# Patient Record
Sex: FEMALE | Race: ASIAN | NOT HISPANIC OR LATINO | URBAN - METROPOLITAN AREA
[De-identification: names, ages, dates, MRNs, and addresses within clinical notes are randomized per-mention and may not be internally consistent; named-entity substitution may affect disease eponyms.]

---

## 2017-05-31 ENCOUNTER — EMERGENCY (EMERGENCY)
Facility: HOSPITAL | Age: 37
LOS: 1 days | Discharge: HOME | End: 2017-05-31
Admitting: EMERGENCY MEDICINE
Payer: COMMERCIAL

## 2017-05-31 VITALS
HEART RATE: 74 BPM | WEIGHT: 134.04 LBS | RESPIRATION RATE: 15 BRPM | SYSTOLIC BLOOD PRESSURE: 114 MMHG | OXYGEN SATURATION: 100 % | TEMPERATURE: 98 F | DIASTOLIC BLOOD PRESSURE: 78 MMHG

## 2017-05-31 LAB
ALBUMIN SERPL ELPH-MCNC: 4.3 G/DL — SIGNIFICANT CHANGE UP (ref 3.3–5)
ALP SERPL-CCNC: 67 U/L — SIGNIFICANT CHANGE UP (ref 40–120)
ALT FLD-CCNC: 17 U/L — SIGNIFICANT CHANGE UP (ref 4–33)
APAP SERPL-MCNC: 16.1 UG/ML — SIGNIFICANT CHANGE UP (ref 15–25)
APPEARANCE UR: CLEAR — SIGNIFICANT CHANGE UP
AST SERPL-CCNC: 18 U/L — SIGNIFICANT CHANGE UP (ref 4–32)
BACTERIA # UR AUTO: SIGNIFICANT CHANGE UP
BASOPHILS # BLD AUTO: 0.02 K/UL — SIGNIFICANT CHANGE UP (ref 0–0.2)
BASOPHILS NFR BLD AUTO: 0.2 % — SIGNIFICANT CHANGE UP (ref 0–2)
BILIRUB SERPL-MCNC: 0.2 MG/DL — SIGNIFICANT CHANGE UP (ref 0.2–1.2)
BILIRUB UR-MCNC: NEGATIVE — SIGNIFICANT CHANGE UP
BLOOD UR QL VISUAL: HIGH
BUN SERPL-MCNC: 9 MG/DL — SIGNIFICANT CHANGE UP (ref 7–23)
CALCIUM SERPL-MCNC: 9.4 MG/DL — SIGNIFICANT CHANGE UP (ref 8.4–10.5)
CHLORIDE SERPL-SCNC: 103 MMOL/L — SIGNIFICANT CHANGE UP (ref 98–107)
CO2 SERPL-SCNC: 23 MMOL/L — SIGNIFICANT CHANGE UP (ref 22–31)
COLOR SPEC: YELLOW — SIGNIFICANT CHANGE UP
CREAT SERPL-MCNC: 0.7 MG/DL — SIGNIFICANT CHANGE UP (ref 0.5–1.3)
EOSINOPHIL # BLD AUTO: 0.02 K/UL — SIGNIFICANT CHANGE UP (ref 0–0.5)
EOSINOPHIL NFR BLD AUTO: 0.2 % — SIGNIFICANT CHANGE UP (ref 0–6)
GLUCOSE SERPL-MCNC: 103 MG/DL — HIGH (ref 70–99)
GLUCOSE UR-MCNC: NEGATIVE — SIGNIFICANT CHANGE UP
HCG SERPL-ACNC: < 5 MIU/ML — SIGNIFICANT CHANGE UP
HCT VFR BLD CALC: 44.9 % — SIGNIFICANT CHANGE UP (ref 34.5–45)
HGB BLD-MCNC: 14.7 G/DL — SIGNIFICANT CHANGE UP (ref 11.5–15.5)
IMM GRANULOCYTES NFR BLD AUTO: 0.3 % — SIGNIFICANT CHANGE UP (ref 0–1.5)
KETONES UR-MCNC: NEGATIVE — SIGNIFICANT CHANGE UP
LEUKOCYTE ESTERASE UR-ACNC: NEGATIVE — SIGNIFICANT CHANGE UP
LIDOCAIN IGE QN: 38.2 U/L — SIGNIFICANT CHANGE UP (ref 7–60)
LYMPHOCYTES # BLD AUTO: 1.87 K/UL — SIGNIFICANT CHANGE UP (ref 1–3.3)
LYMPHOCYTES # BLD AUTO: 14.6 % — SIGNIFICANT CHANGE UP (ref 13–44)
MCHC RBC-ENTMCNC: 28.3 PG — SIGNIFICANT CHANGE UP (ref 27–34)
MCHC RBC-ENTMCNC: 32.7 % — SIGNIFICANT CHANGE UP (ref 32–36)
MCV RBC AUTO: 86.5 FL — SIGNIFICANT CHANGE UP (ref 80–100)
MONOCYTES # BLD AUTO: 0.37 K/UL — SIGNIFICANT CHANGE UP (ref 0–0.9)
MONOCYTES NFR BLD AUTO: 2.9 % — SIGNIFICANT CHANGE UP (ref 2–14)
MUCOUS THREADS # UR AUTO: SIGNIFICANT CHANGE UP
NEUTROPHILS # BLD AUTO: 10.53 K/UL — HIGH (ref 1.8–7.4)
NEUTROPHILS NFR BLD AUTO: 81.8 % — HIGH (ref 43–77)
NITRITE UR-MCNC: NEGATIVE — SIGNIFICANT CHANGE UP
NON-SQ EPI CELLS # UR AUTO: <1 — SIGNIFICANT CHANGE UP
PH UR: 6.5 — SIGNIFICANT CHANGE UP (ref 4.6–8)
PLATELET # BLD AUTO: 345 K/UL — SIGNIFICANT CHANGE UP (ref 150–400)
PMV BLD: 10.5 FL — SIGNIFICANT CHANGE UP (ref 7–13)
POTASSIUM SERPL-MCNC: 4.3 MMOL/L — SIGNIFICANT CHANGE UP (ref 3.5–5.3)
POTASSIUM SERPL-SCNC: 4.3 MMOL/L — SIGNIFICANT CHANGE UP (ref 3.5–5.3)
PROT SERPL-MCNC: 8.3 G/DL — SIGNIFICANT CHANGE UP (ref 6–8.3)
PROT UR-MCNC: 20 — SIGNIFICANT CHANGE UP
RBC # BLD: 5.19 M/UL — SIGNIFICANT CHANGE UP (ref 3.8–5.2)
RBC # FLD: 13.8 % — SIGNIFICANT CHANGE UP (ref 10.3–14.5)
RBC CASTS # UR COMP ASSIST: HIGH (ref 0–?)
SODIUM SERPL-SCNC: 141 MMOL/L — SIGNIFICANT CHANGE UP (ref 135–145)
SP GR SPEC: 1.02 — SIGNIFICANT CHANGE UP (ref 1–1.03)
SQUAMOUS # UR AUTO: SIGNIFICANT CHANGE UP
UROBILINOGEN FLD QL: NORMAL E.U. — SIGNIFICANT CHANGE UP (ref 0.1–0.2)
WBC # BLD: 12.85 K/UL — HIGH (ref 3.8–10.5)
WBC # FLD AUTO: 12.85 K/UL — HIGH (ref 3.8–10.5)
WBC UR QL: SIGNIFICANT CHANGE UP (ref 0–?)

## 2017-05-31 PROCEDURE — 99285 EMERGENCY DEPT VISIT HI MDM: CPT

## 2017-05-31 PROCEDURE — 74176 CT ABD & PELVIS W/O CONTRAST: CPT | Mod: 26

## 2017-05-31 RX ORDER — ONDANSETRON 8 MG/1
4 TABLET, FILM COATED ORAL ONCE
Qty: 0 | Refills: 0 | Status: COMPLETED | OUTPATIENT
Start: 2017-05-31 | End: 2017-05-31

## 2017-05-31 RX ORDER — KETOROLAC TROMETHAMINE 30 MG/ML
15 SYRINGE (ML) INJECTION ONCE
Qty: 0 | Refills: 0 | Status: DISCONTINUED | OUTPATIENT
Start: 2017-05-31 | End: 2017-05-31

## 2017-05-31 RX ORDER — ONDANSETRON 8 MG/1
1 TABLET, FILM COATED ORAL
Qty: 15 | Refills: 0 | OUTPATIENT
Start: 2017-05-31 | End: 2017-06-05

## 2017-05-31 RX ORDER — OXYCODONE HYDROCHLORIDE 5 MG/1
1 TABLET ORAL
Qty: 8 | Refills: 0 | OUTPATIENT
Start: 2017-05-31 | End: 2017-06-02

## 2017-05-31 RX ORDER — IBUPROFEN 200 MG
1 TABLET ORAL
Qty: 15 | Refills: 0 | OUTPATIENT
Start: 2017-05-31 | End: 2017-06-05

## 2017-05-31 RX ADMIN — Medication 15 MILLIGRAM(S): at 13:50

## 2017-05-31 RX ADMIN — ONDANSETRON 4 MILLIGRAM(S): 8 TABLET, FILM COATED ORAL at 11:59

## 2017-05-31 RX ADMIN — Medication 15 MILLIGRAM(S): at 14:18

## 2017-05-31 NOTE — ED ADULT TRIAGE NOTE - CCCP TRG CHIEF CMPLNT
flank pain/(L), started in back 3d ago, traveling to L flank and LLQ. w this am vomiting, and dysuria for past month

## 2017-05-31 NOTE — ED PROVIDER NOTE - CARE PLAN
Principal Discharge DX:	Ureteral stone  Instructions for follow-up, activity and diet:	Rest, drink plenty of fluids.  Advance activity as tolerated.  Continue all previously prescribed medications as directed.  Take Motrin 600 mg (three 200 mg over the counter pills) every 8 hours as needed for moderate pain -- take with food. Take Percocet 325/5 once every 6 hours as needed for severe pain -- causes drowsiness; DO NOT drink alcohol, drive, or operate heavy machinery with this medication. Take Zofran 4 mg ODT every 8 hours as needed for nausea and vomiting. Follow up with your primary care physician (DISCUSS LUNG AND LIVER LESIONS ON CT SCAN) and urology (referral list provided)  in 48-72 hours- bring copies of your results.  Return to the ER for worsening or persistent symptoms, including but not limited to fevers, vomiting, worsening/persistent pain and/or ANY NEW OR CONCERNING SYMPTOMS. If you have issues obtaining follow up, please call: [phone] to obtain a doctor or specialist who takes your insurance in your area. Principal Discharge DX:	Ureteral stone  Instructions for follow-up, activity and diet:	Rest, drink plenty of fluids.  Advance activity as tolerated.  Continue all previously prescribed medications as directed.  Take Motrin 600 mg (three 200 mg over the counter pills) every 8 hours as needed for moderate pain -- take with food. Take Percocet 325/5 once every 6 hours as needed for severe pain -- causes drowsiness; DO NOT drink alcohol, drive, or operate heavy machinery with this medication. Take Zofran 4 mg ODT every 8 hours as needed for nausea and vomiting. Follow up with your primary care physician (DISCUSS LUNG AND LIVER LESIONS ON CT SCAN) and urology (referral list provided)  in 48-72 hours- bring copies of your results.  Return to the ER for worsening or persistent symptoms, including but not limited to fevers, vomiting, worsening/persistent pain and/or ANY NEW OR CONCERNING SYMPTOMS. If you have issues obtaining follow up, please call: [phone] to obtain a doctor or specialist who takes your insurance in your area.

## 2017-05-31 NOTE — ED PROVIDER NOTE - OBJECTIVE STATEMENT
Pt is a 36 y/o F nonsmoker no PMHx p/w left sided flank pain x 3 days.  Pt notes gradual onset, constant left sided flank pain nonpleuritic radiating to LLQ described as dull aching, moderate to severe in intensity associated with nausea.  Pt notes pain worsens, at times, with movement.  Today, pt experienced nausea and NBNB vomiting x 1.  Pt states today pain improving, no 6/10 in intensity.  Pt states she took extra strength tylenol x 3 pills, which pt vomited.  Pt then took another 3 extra strength tylenol pills over four hours.  Pt notes dysuria 1 month ago.  Denies any fevers, chills, chest pain, SOB, ROMERO, diarrhea, constipation, hematuria, cloudy urine, vaginal bleeding, abdominal distension, trauma, calf pain/swelling, h/o dvt/pe in past, any prolonged immobilization.  Last BM today.  Pt passing gas today.  No known h/o kidney stones.

## 2017-05-31 NOTE — ED PROVIDER NOTE - PLAN OF CARE
Rest, drink plenty of fluids.  Advance activity as tolerated.  Continue all previously prescribed medications as directed.  Take Motrin 600 mg (three 200 mg over the counter pills) every 8 hours as needed for moderate pain -- take with food. Take Percocet 325/5 once every 6 hours as needed for severe pain -- causes drowsiness; DO NOT drink alcohol, drive, or operate heavy machinery with this medication. Take Zofran 4 mg ODT every 8 hours as needed for nausea and vomiting. Follow up with your primary care physician (DISCUSS LUNG AND LIVER LESIONS ON CT SCAN) and urology (referral list provided)  in 48-72 hours- bring copies of your results.  Return to the ER for worsening or persistent symptoms, including but not limited to fevers, vomiting, worsening/persistent pain and/or ANY NEW OR CONCERNING SYMPTOMS. If you have issues obtaining follow up, please call: [phone] to obtain a doctor or specialist who takes your insurance in your area.

## 2017-05-31 NOTE — ED PROVIDER NOTE - MEDICAL DECISION MAKING DETAILS
Pt is a 36 y/o F nonsmoker no PMHx p/w left sided flank pain x 3 days -- r/o pyelonephritis, r/o renal stone, possibly musculoskeletal -- labs, ua, ucx, CT abd and pelvis w/o contrast, pain control, antiemetics

## 2017-05-31 NOTE — ED PROVIDER NOTE - NONTENDER LOCATION
left upper quadrant/umbilical/right upper quadrant/right lower quadrant/right costovertebral angle/left lower quadrant/suprapubic/periumbilical/left costovertebral angle

## 2017-05-31 NOTE — ED PROVIDER NOTE - PROGRESS NOTE DETAILS
KATE Victor:  Pt notes improvement in symptoms.  Pt tolerating PO solids and liquids.  CT shows 3 mm stone with minimal hydronephrosis.  CT also notes pulmonary and hepatic lesions, which have increase since last CT (2015).  Discussed results at length with pt and family who agrees to have outpatient work up for lesions.  Pt medically stable for discharge.  Pt to follow up with PMD and urology (referral list provided).  Urine strainer provided. KATE Victor:  Pt notes improvement in symptoms.  Pt tolerating PO solids and liquids.  CT shows 3 mm stone with minimal hydronephrosis.  CT also notes pulmonary and hepatic lesions, which have increase since last CT (2015).  Discussed results at length with pt and family who states lesions already known to patient and has serial CT scans with her PMD every 6 months.  Pt medically stable for discharge.  Pt to follow up with PMD and urology (referral list provided).  Urine strainer provided.

## 2017-06-01 LAB — SPECIMEN SOURCE: SIGNIFICANT CHANGE UP

## 2017-06-02 LAB — BACTERIA UR CULT: SIGNIFICANT CHANGE UP

## 2018-02-16 ENCOUNTER — INPATIENT (INPATIENT)
Facility: HOSPITAL | Age: 38
LOS: 0 days | Discharge: HOME | End: 2018-02-17
Attending: HOSPITALIST | Admitting: HOSPITALIST
Payer: COMMERCIAL

## 2018-02-16 VITALS
OXYGEN SATURATION: 119 % | TEMPERATURE: 98 F | HEART RATE: 118 BPM | DIASTOLIC BLOOD PRESSURE: 97 MMHG | RESPIRATION RATE: 16 BRPM | SYSTOLIC BLOOD PRESSURE: 151 MMHG

## 2018-02-16 DIAGNOSIS — J90 PLEURAL EFFUSION, NOT ELSEWHERE CLASSIFIED: ICD-10-CM

## 2018-02-16 LAB
ALBUMIN SERPL ELPH-MCNC: 4.4 G/DL — SIGNIFICANT CHANGE UP (ref 3.3–5)
ALP SERPL-CCNC: 70 U/L — SIGNIFICANT CHANGE UP (ref 40–120)
ALT FLD-CCNC: 9 U/L — SIGNIFICANT CHANGE UP (ref 4–33)
AST SERPL-CCNC: 16 U/L — SIGNIFICANT CHANGE UP (ref 4–32)
BASOPHILS # BLD AUTO: 0.05 K/UL — SIGNIFICANT CHANGE UP (ref 0–0.2)
BASOPHILS NFR BLD AUTO: 0.4 % — SIGNIFICANT CHANGE UP (ref 0–2)
BILIRUB SERPL-MCNC: 0.2 MG/DL — SIGNIFICANT CHANGE UP (ref 0.2–1.2)
BUN SERPL-MCNC: 11 MG/DL — SIGNIFICANT CHANGE UP (ref 7–23)
CALCIUM SERPL-MCNC: 8.9 MG/DL — SIGNIFICANT CHANGE UP (ref 8.4–10.5)
CHLORIDE SERPL-SCNC: 102 MMOL/L — SIGNIFICANT CHANGE UP (ref 98–107)
CK MB BLD-MCNC: 1 NG/ML — SIGNIFICANT CHANGE UP (ref 1–4.7)
CK MB BLD-MCNC: SIGNIFICANT CHANGE UP (ref 0–2.5)
CK SERPL-CCNC: 61 U/L — SIGNIFICANT CHANGE UP (ref 25–170)
CO2 SERPL-SCNC: 21 MMOL/L — LOW (ref 22–31)
CREAT SERPL-MCNC: 0.66 MG/DL — SIGNIFICANT CHANGE UP (ref 0.5–1.3)
EOSINOPHIL # BLD AUTO: 0.08 K/UL — SIGNIFICANT CHANGE UP (ref 0–0.5)
EOSINOPHIL NFR BLD AUTO: 0.6 % — SIGNIFICANT CHANGE UP (ref 0–6)
GLUCOSE SERPL-MCNC: 110 MG/DL — HIGH (ref 70–99)
HCG SERPL-ACNC: < 5 MIU/ML — SIGNIFICANT CHANGE UP
HCT VFR BLD CALC: 45 % — SIGNIFICANT CHANGE UP (ref 34.5–45)
HGB BLD-MCNC: 15 G/DL — SIGNIFICANT CHANGE UP (ref 11.5–15.5)
IMM GRANULOCYTES # BLD AUTO: 0.06 # — SIGNIFICANT CHANGE UP
IMM GRANULOCYTES NFR BLD AUTO: 0.4 % — SIGNIFICANT CHANGE UP (ref 0–1.5)
INR BLD: 1.11 — SIGNIFICANT CHANGE UP (ref 0.88–1.17)
LYMPHOCYTES # BLD AUTO: 23.4 % — SIGNIFICANT CHANGE UP (ref 13–44)
LYMPHOCYTES # BLD AUTO: 3.18 K/UL — SIGNIFICANT CHANGE UP (ref 1–3.3)
MCHC RBC-ENTMCNC: 28.4 PG — SIGNIFICANT CHANGE UP (ref 27–34)
MCHC RBC-ENTMCNC: 33.3 % — SIGNIFICANT CHANGE UP (ref 32–36)
MCV RBC AUTO: 85.1 FL — SIGNIFICANT CHANGE UP (ref 80–100)
MONOCYTES # BLD AUTO: 0.79 K/UL — SIGNIFICANT CHANGE UP (ref 0–0.9)
MONOCYTES NFR BLD AUTO: 5.8 % — SIGNIFICANT CHANGE UP (ref 2–14)
NEUTROPHILS # BLD AUTO: 9.43 K/UL — HIGH (ref 1.8–7.4)
NEUTROPHILS NFR BLD AUTO: 69.4 % — SIGNIFICANT CHANGE UP (ref 43–77)
NRBC # FLD: 0 — SIGNIFICANT CHANGE UP
NT-PROBNP SERPL-SCNC: 11.19 PG/ML — SIGNIFICANT CHANGE UP
PLATELET # BLD AUTO: 432 K/UL — HIGH (ref 150–400)
PMV BLD: 9.9 FL — SIGNIFICANT CHANGE UP (ref 7–13)
POTASSIUM SERPL-MCNC: 4 MMOL/L — SIGNIFICANT CHANGE UP (ref 3.5–5.3)
POTASSIUM SERPL-SCNC: 4 MMOL/L — SIGNIFICANT CHANGE UP (ref 3.5–5.3)
PROT SERPL-MCNC: 8.6 G/DL — HIGH (ref 6–8.3)
PROTHROM AB SERPL-ACNC: 12.3 SEC — SIGNIFICANT CHANGE UP (ref 9.8–13.1)
RBC # BLD: 5.29 M/UL — HIGH (ref 3.8–5.2)
RBC # FLD: 13.4 % — SIGNIFICANT CHANGE UP (ref 10.3–14.5)
SODIUM SERPL-SCNC: 140 MMOL/L — SIGNIFICANT CHANGE UP (ref 135–145)
TROPONIN T SERPL-MCNC: < 0.06 NG/ML — SIGNIFICANT CHANGE UP (ref 0–0.06)
WBC # BLD: 13.59 K/UL — HIGH (ref 3.8–10.5)
WBC # FLD AUTO: 13.59 K/UL — HIGH (ref 3.8–10.5)

## 2018-02-16 PROCEDURE — 99223 1ST HOSP IP/OBS HIGH 75: CPT

## 2018-02-16 PROCEDURE — 71260 CT THORAX DX C+: CPT | Mod: 26

## 2018-02-16 RX ORDER — CHOLECALCIFEROL (VITAMIN D3) 125 MCG
1000 CAPSULE ORAL DAILY
Qty: 0 | Refills: 0 | Status: DISCONTINUED | OUTPATIENT
Start: 2018-02-16 | End: 2018-02-17

## 2018-02-16 RX ORDER — SODIUM CHLORIDE 9 MG/ML
1000 INJECTION INTRAMUSCULAR; INTRAVENOUS; SUBCUTANEOUS
Qty: 0 | Refills: 0 | Status: DISCONTINUED | OUTPATIENT
Start: 2018-02-16 | End: 2018-02-17

## 2018-02-16 RX ORDER — PREGABALIN 225 MG/1
1000 CAPSULE ORAL DAILY
Qty: 0 | Refills: 0 | Status: DISCONTINUED | OUTPATIENT
Start: 2018-02-16 | End: 2018-02-17

## 2018-02-16 RX ORDER — OMEPRAZOLE 10 MG/1
1 CAPSULE, DELAYED RELEASE ORAL
Qty: 0 | Refills: 0 | COMMUNITY

## 2018-02-16 RX ORDER — CHOLECALCIFEROL (VITAMIN D3) 125 MCG
1 CAPSULE ORAL
Qty: 0 | Refills: 0 | COMMUNITY

## 2018-02-16 RX ORDER — PANTOPRAZOLE SODIUM 20 MG/1
40 TABLET, DELAYED RELEASE ORAL
Qty: 0 | Refills: 0 | Status: DISCONTINUED | OUTPATIENT
Start: 2018-02-16 | End: 2018-02-17

## 2018-02-16 RX ORDER — PREGABALIN 225 MG/1
1 CAPSULE ORAL
Qty: 0 | Refills: 0 | COMMUNITY

## 2018-02-16 NOTE — ED PROVIDER NOTE - OBJECTIVE STATEMENT
36 y/o female no pmh c/o cough and sob x4 days. Pt admits to nonproductive cough and esquivel x4 days. Pt can only walk up 1 flight of stairs before becoming sob. Pt went to PMD x1 day ago and had cxr which revealed left sided opacity and was sent to ER to r/o effusion. Pt denies chest pain, palpitations, diaphoresis, n/v/d, numbness, tingling, weakness, dizziness, syncope, fever or chills. Denies leg swelling or recent travel. Of note pt admits to hx of "liver and lung spots", since 2013 which she gets CT scans for every 6 months and both are decreasing in size.

## 2018-02-16 NOTE — H&P ADULT - FAMILY HISTORY
Father  Still living? Unknown  Family history of heart attack, Age at diagnosis: Age Unknown  Family history of diabetes mellitus, Age at diagnosis: Age Unknown

## 2018-02-16 NOTE — H&P ADULT - MUSCULOSKELETAL
details… detailed exam no calf tenderness/no joint swelling/no joint erythema/ROM intact/no joint warmth

## 2018-02-16 NOTE — H&P ADULT - PROBLEM SELECTOR PLAN 2
Likely malignant c/b ROMERO, compressive atelectasis and heart and mediastinum shift 2/2 mass effect;  -Pulmonary c/s in am for decompressive therapeutic and diagnostic thoracentesis in am

## 2018-02-16 NOTE — ED ADULT NURSE NOTE - OBJECTIVE STATEMENT
Pt received to intake ED room 6 to r/o pleural effusion from PMD. Pt reporting cough and SOB x1 week, CXR from PMD showing opacity. Pt received awake, alert, oriented x4; respirations appearing even, unlabored at this time. Pt denies chest pain. Pt does not endorse N/V/D. Visitor at bedside identified as . Unable to obtain Labs/secure IV access at this time, Dr Arceo made aware. Safety maintained.

## 2018-02-16 NOTE — H&P ADULT - PROBLEM SELECTOR PLAN 1
Due to likely metastatic disease c/b leukocytosis and tachycardia in the setting of large Lt pleural effusion; Low suspicion of infectious process;  -Monitor for fever   -VS q4h  -Hold Abx   -Malignancy workup (below)  -Decompressive thoracentesis in am as below (holding DVP ppx)

## 2018-02-16 NOTE — ED PROVIDER NOTE - MEDICAL DECISION MAKING DETAILS
38 y/o female w/ left sided pleural effusion- labs, CT chest, admit Pavithra att: 36 y/o female p/w cough and sob x 4 days here found to have left sided pleural effusion- labs, CT chest, admit

## 2018-02-16 NOTE — H&P ADULT - PROBLEM SELECTOR PLAN 3
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

## 2018-02-16 NOTE — ED ADULT TRIAGE NOTE - CHIEF COMPLAINT QUOTE
pt amb to triage c/o "tiredness" x 4 days sent from [facility] for as per note "eval of SOB on exertion, chest xray shows L side opacity, r/o effusion" no resp distress noted on presentation, denies CP, fever chills, or flu like symptoms

## 2018-02-16 NOTE — H&P ADULT - HISTORY OF PRESENT ILLNESS
36 y/o female no pmh c/o cough nonproductive cough and ROMERO x4 days. States that can walk up 1 flight of stairs before becoming SOB. Was evaluated by a PMD a day ago and had CXR significant for left sided opacity and was sent to ER to r/o effusion. Pt reports no chest pain, palpitations, diaphoresis, n/v/d, numbness, tingling, weakness, dizziness, syncope, fever or chills. Also reports no legs swelling or recent travel. Of note pt reports hx of "liver and lung spots" since 2013 which are monitored with CT scans for every 6 months. 36 y/o female no pmh c/o cough nonproductive cough and tiredness x4 days. States that can walk up 1 flight of stairs before becoming tired. specifically the pt says that does not experience SOB.  Was evaluated by a PMD a day ago and had CXR significant for left sided opacity and was sent to ER to r/o effusion. Pt reports no chest pain, palpitations, diaphoresis, n/v/d, numbness, tingling, weakness, dizziness, syncope, fever or chills. Also reports no legs swelling or recent travel. Of note pt reports hx of "liver and lung spots" since 2013 which are monitored with CT scans for every 6 months. 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 States was sent for second opinion to [facility] in June 2017 and was told that she was "ok".   On this admission the pt c/o nonproductive cough and tiredness x4 days. States that can walk up 1 flight of stairs before becoming tired. Specifically the pt says that does not experience SOB, CP, fever, palpitations sore throat, runny nose.  Was evaluated by a PMD a day ago - CXR was significant for left sided opacity. She was referred to ED to r/o effusion. Pt reports no legs swelling or recent travel. 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 States was sent for second opinion to [facility] in June 2017 and was told that she was "ok".   On this admission the pt c/o nonproductive cough and tiredness x4 days. States that can walk up 1 flight of stairs before becoming tired. Specifically the pt says that does not experience SOB, CP, fever, palpitations sore throat, runny nose.  Was evaluated by a PMD a day ago - CXR was significant for left sided opacity. She was referred to ED to r/o effusion. Pt reports no legs swelling or recent travel.

## 2018-02-16 NOTE — H&P ADULT - ASSESSMENT
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 36 y/o female, non smoker, on OCP, ambulatory without assistive devices, h/o liver mass - 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

## 2018-02-16 NOTE — H&P ADULT - NSHPLABSRESULTS_GEN_ALL_CORE
EKG, 2/16/2018, Sinus tachycardia, 116bpm, qtc 430, no acute Tw or ST changes - my reading     CT of the Chest  with intravenous contrast.   LUNGS AND LARGE AIRWAYS: Patent central airways. Innumerable scattered   bilateral pulmonary nodules which have significantly progressed from   2/10/2015. Interval consolidative left upper lobe opacity measuring 2.5 x   2.1 cm.  PLEURA: Partially loculated large left pleural effusion extending to the   apex with adjacent atelectasis.  VESSELS: Within normal limits.  HEART: No cardiomegaly or pericardial effusion. Heart and mediastinum   shifted to the right side secondary to mass effect from the left-sided   pleural effusion.  MEDIASTINUM AND SANGITA: No lymphadenopathy.  CHEST WALL AND LOWER NECK: Within normal limits.  UPPER ABDOMEN: Scattered hepatic lesions in the visualized upper abdomen   concerning for metastases.  BONES: Blastic lesion noted within the sternal mandibular junction,   concerning for osseous metastasis.  IMPRESSION:  Innumerable scattered bilateral pulmonary nodules which have   significantly progressed from 2/10/2015, concerning for worsening   malignancy or metastatic disease. Interval consolidative left upper lobe   opacity measuring 2.5 x 2.1 cm.    New partially loculated large left pleural effusion extending to the apex   with adjacent compressive atelectasis. Heart and mediastinum shifted to   the right side secondary to mass effect from large left-sided pleural   effusion, decompression is advised.    Additional findings as above.

## 2018-02-17 ENCOUNTER — TRANSCRIPTION ENCOUNTER (OUTPATIENT)
Age: 38
End: 2018-02-17

## 2018-02-17 VITALS
HEART RATE: 106 BPM | TEMPERATURE: 98 F | OXYGEN SATURATION: 99 % | RESPIRATION RATE: 20 BRPM | SYSTOLIC BLOOD PRESSURE: 150 MMHG | DIASTOLIC BLOOD PRESSURE: 94 MMHG

## 2018-02-17 DIAGNOSIS — Z29.9 ENCOUNTER FOR PROPHYLACTIC MEASURES, UNSPECIFIED: ICD-10-CM

## 2018-02-17 DIAGNOSIS — D48.9 NEOPLASM OF UNCERTAIN BEHAVIOR, UNSPECIFIED: ICD-10-CM

## 2018-02-17 DIAGNOSIS — R65.10 SYSTEMIC INFLAMMATORY RESPONSE SYNDROME (SIRS) OF NON-INFECTIOUS ORIGIN WITHOUT ACUTE ORGAN DYSFUNCTION: ICD-10-CM

## 2018-02-17 DIAGNOSIS — J90 PLEURAL EFFUSION, NOT ELSEWHERE CLASSIFIED: ICD-10-CM

## 2018-02-17 PROCEDURE — 99254 IP/OBS CNSLTJ NEW/EST MOD 60: CPT | Mod: GC

## 2018-02-17 PROCEDURE — 99239 HOSP IP/OBS DSCHRG MGMT >30: CPT

## 2018-02-17 PROCEDURE — 99222 1ST HOSP IP/OBS MODERATE 55: CPT | Mod: GC

## 2018-02-17 RX ORDER — NORETHINDRONE AND ETHINYL ESTRADIOL 0.4-0.035
1 KIT ORAL
Qty: 0 | Refills: 0 | COMMUNITY

## 2018-02-17 RX ADMIN — PANTOPRAZOLE SODIUM 40 MILLIGRAM(S): 20 TABLET, DELAYED RELEASE ORAL at 05:50

## 2018-02-17 RX ADMIN — SODIUM CHLORIDE 125 MILLILITER(S): 9 INJECTION INTRAMUSCULAR; INTRAVENOUS; SUBCUTANEOUS at 00:21

## 2018-02-17 RX ADMIN — SODIUM CHLORIDE 125 MILLILITER(S): 9 INJECTION INTRAMUSCULAR; INTRAVENOUS; SUBCUTANEOUS at 05:50

## 2018-02-17 RX ADMIN — SODIUM CHLORIDE 125 MILLILITER(S): 9 INJECTION INTRAMUSCULAR; INTRAVENOUS; SUBCUTANEOUS at 08:03

## 2018-02-17 RX ADMIN — Medication 1000 UNIT(S): at 12:17

## 2018-02-17 RX ADMIN — PREGABALIN 1000 MICROGRAM(S): 225 CAPSULE ORAL at 15:00

## 2018-02-17 NOTE — PROGRESS NOTE ADULT - PROBLEM SELECTOR PLAN 2
r/o  malignancy,  c/b ROMERO, compressive atelectasis and heart and mediastinum shift 2/2 mass effect;  -Pulmonary c/s for decompressive therapeutic and diagnostic thoracentesis

## 2018-02-17 NOTE — PROGRESS NOTE ADULT - SUBJECTIVE AND OBJECTIVE BOX
Patient is a 37y old  Female who presents with a chief complaint of pleural effusion (17 Feb 2018 05:14)      SUBJECTIVE / OVERNIGHT EVENTS: Feels better. Denies any chest pain or SOB.    MEDICATIONS  (STANDING):  cholecalciferol 1000 Unit(s) Oral daily  cyanocobalamin 1000 MICROGram(s) Oral daily  pantoprazole    Tablet 40 milliGRAM(s) Oral before breakfast    MEDICATIONS  (PRN):      Vital Signs Last 24 Hrs  T(C): 37 (17 Feb 2018 06:50), Max: 37 (16 Feb 2018 22:15)  T(F): 98.6 (17 Feb 2018 06:50), Max: 98.6 (16 Feb 2018 22:15)  HR: 105 (17 Feb 2018 06:50) (100 - 118)  BP: 133/90 (17 Feb 2018 06:50) (124/78 - 151/97)  BP(mean): --  RR: 18 (17 Feb 2018 06:50) (16 - 19)  SpO2: 96% (17 Feb 2018 06:50) (95% - 119%)  CAPILLARY BLOOD GLUCOSE        I&O's Summary      PHYSICAL EXAM:  GENERAL: NAD, well-developed  HEAD:  Atraumatic, Normocephalic  EYES: EOMI, PERRLA, conjunctiva and sclera clear  NECK: Supple, No JVD  CHEST/LUNG: Decreased BS at left base; No wheeze  HEART: Regular rate and rhythm; No murmurs, rubs, or gallops  ABDOMEN: Soft, Nontender, Nondistended; Bowel sounds present  EXTREMITIES:  2+ Peripheral Pulses, No clubbing, cyanosis, or edema  PSYCH: AAOx3  NEUROLOGY: non-focal  SKIN: No rashes or lesions    LABS:                        15.0   13.59 )-----------( 432      ( 16 Feb 2018 20:02 )             45.0     02-16    140  |  102  |  11  ----------------------------<  110<H>  4.0   |  21<L>  |  0.66    Ca    8.9      16 Feb 2018 20:02    TPro  8.6<H>  /  Alb  4.4  /  TBili  0.2  /  DBili  x   /  AST  16  /  ALT  9   /  AlkPhos  70  02-16    PT/INR - ( 16 Feb 2018 20:02 )   PT: 12.3 SEC;   INR: 1.11            CARDIAC MARKERS ( 16 Feb 2018 20:02 )  x     / < 0.06 ng/mL / 61 u/L / 1.00 ng/mL / x              RADIOLOGY & ADDITIONAL TESTS:    Imaging Personally Reviewed: CT of chest  < from: CT Chest w/ IV Cont (02.16.18 @ 20:32) >  IMPRESSION:    Innumerable scattered bilateral pulmonary nodules which have   significantly progressed from 2/10/2015, concerning for worsening   malignancy or metastatic disease.Interval consolidative left upper lobe   opacity measuring 2.5 x 2.1cm.    New partially loculated large left pleural effusion extending to the apex   with adjacent compressive atelectasis.Heart and mediastinum shifted to   the right side secondary to mass effect from large left-sided pleural   effusion, decompression is advised.    Additional findings as above.    < end of copied text >      Consultant(s) Notes Reviewed:      Care Discussed with Consultants/Other Providers:

## 2018-02-17 NOTE — CONSULT NOTE ADULT - SUBJECTIVE AND OBJECTIVE BOX
CHIEF COMPLAINT:    HPI:    PAST MEDICAL & SURGICAL HISTORY:  No pertinent past medical history  No significant past surgical history      FAMILY HISTORY:  Family history of diabetes mellitus (Father)  Family history of heart attack (Father)      SOCIAL HISTORY:  Smoking: __ packs x ___ years  EtOH Use:  Marital Status:  Occupation:  Recent Travel:  Country of Birth:  Advance Directives:    Allergies    No Known Allergies    Intolerances        HOME MEDICATIONS:    REVIEW OF SYSTEMS:  Constitutional:   Eyes:  ENT:  CV:  Resp:  GI:  :  MSK:  Integumentary:  Neurological:  Psychiatric:  Endocrine:  Hematologic/Lymphatic:  Allergic/Immunologic:  [ ] All other systems negative  [ ] Unable to assess ROS because ________    OBJECTIVE:  ICU Vital Signs Last 24 Hrs  T(C): 37 (17 Feb 2018 06:50), Max: 37 (16 Feb 2018 22:15)  T(F): 98.6 (17 Feb 2018 06:50), Max: 98.6 (16 Feb 2018 22:15)  HR: 105 (17 Feb 2018 06:50) (100 - 118)  BP: 133/90 (17 Feb 2018 06:50) (124/78 - 151/97)  BP(mean): --  ABP: --  ABP(mean): --  RR: 18 (17 Feb 2018 06:50) (16 - 19)  SpO2: 96% (17 Feb 2018 06:50) (95% - 119%)        CAPILLARY BLOOD GLUCOSE          PHYSICAL EXAM:  General:  AAOx3  HEENT: EOMI, PERRL  Lymph Nodes: No LAD  Neck: JVP 4-6cm  Respiratory:   Cardiovascular: RRR, no m/r/g  Abdomen: soft, NT/ND, no HSM  Extremities: no c/c/e  Skin: nl. skin turgor  Neurological: no deficitis      HOSPITAL MEDICATIONS:  MEDICATIONS  (STANDING):  cholecalciferol 1000 Unit(s) Oral daily  cyanocobalamin 1000 MICROGram(s) Oral daily  pantoprazole    Tablet 40 milliGRAM(s) Oral before breakfast    MEDICATIONS  (PRN):      LABS:                        15.0   13.59 )-----------( 432      ( 16 Feb 2018 20:02 )             45.0     02-16    140  |  102  |  11  ----------------------------<  110<H>  4.0   |  21<L>  |  0.66    Ca    8.9      16 Feb 2018 20:02    TPro  8.6<H>  /  Alb  4.4  /  TBili  0.2  /  DBili  x   /  AST  16  /  ALT  9   /  AlkPhos  70  02-16    PT/INR - ( 16 Feb 2018 20:02 )   PT: 12.3 SEC;   INR: 1.11                    MICROBIOLOGY:     RADIOLOGY:  [ ] Reviewed and interpreted by me    PULMONARY FUNCTION TESTS:    EKG: CHIEF COMPLAINT:    HPI:    36 y/o female, non smoker, on OCP, ambulatory without assistive devices, the pt has h/o liver mass - per [facility] records 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: MRI Abd w/ con 10/22/2013, CT chest 9/13 - [facility] scanned documents, r/o metastatic disease per report);    PAST MEDICAL & SURGICAL HISTORY:  No pertinent past medical history  No significant past surgical history      FAMILY HISTORY:  Family history of diabetes mellitus (Father)  Family history of heart attack (Father)      SOCIAL HISTORY:  Smoking: __ packs x ___ years  EtOH Use:  Marital Status:  Occupation:  Recent Travel:  Country of Birth:  Advance Directives:    Allergies    No Known Allergies    Intolerances        HOME MEDICATIONS:    REVIEW OF SYSTEMS:  Constitutional:   Eyes:  ENT:  CV:  Resp:  GI:  :  MSK:  Integumentary:  Neurological:  Psychiatric:  Endocrine:  Hematologic/Lymphatic:  Allergic/Immunologic:  [ ] All other systems negative  [ ] Unable to assess ROS because ________    OBJECTIVE:  ICU Vital Signs Last 24 Hrs  T(C): 37 (17 Feb 2018 06:50), Max: 37 (16 Feb 2018 22:15)  T(F): 98.6 (17 Feb 2018 06:50), Max: 98.6 (16 Feb 2018 22:15)  HR: 105 (17 Feb 2018 06:50) (100 - 118)  BP: 133/90 (17 Feb 2018 06:50) (124/78 - 151/97)  BP(mean): --  ABP: --  ABP(mean): --  RR: 18 (17 Feb 2018 06:50) (16 - 19)  SpO2: 96% (17 Feb 2018 06:50) (95% - 119%)        CAPILLARY BLOOD GLUCOSE          PHYSICAL EXAM:  General:  AAOx3  HEENT: EOMI, PERRL  Lymph Nodes: No LAD  Neck: JVP 4-6cm  Respiratory:   Cardiovascular: RRR, no m/r/g  Abdomen: soft, NT/ND, no HSM  Extremities: no c/c/e  Skin: nl. skin turgor  Neurological: no deficitis      HOSPITAL MEDICATIONS:  MEDICATIONS  (STANDING):  cholecalciferol 1000 Unit(s) Oral daily  cyanocobalamin 1000 MICROGram(s) Oral daily  pantoprazole    Tablet 40 milliGRAM(s) Oral before breakfast    MEDICATIONS  (PRN):      LABS:                        15.0   13.59 )-----------( 432      ( 16 Feb 2018 20:02 )             45.0     02-16    140  |  102  |  11  ----------------------------<  110<H>  4.0   |  21<L>  |  0.66    Ca    8.9      16 Feb 2018 20:02    TPro  8.6<H>  /  Alb  4.4  /  TBili  0.2  /  DBili  x   /  AST  16  /  ALT  9   /  AlkPhos  70  02-16    PT/INR - ( 16 Feb 2018 20:02 )   PT: 12.3 SEC;   INR: 1.11                    MICROBIOLOGY:     RADIOLOGY:  [ ] Reviewed and interpreted by me    PULMONARY FUNCTION TESTS:    EKG: CHIEF COMPLAINT:    HPI:    36 y/o female with PMH of a liver lesion- 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  Rare tumor which is similar to a sarcoma.  Never received therapy and was following with serial imaging over the next 4 years.    She presented to the hospital with c/o dyspnea on exertion. Symptoms began on Monday and she went to her PMD who took a CXR, saw an abnormality and told her to come into the hospital.    Denies cough/fevers/chills.    She is a non-smoker, worked as a home .     PAST MEDICAL & SURGICAL HISTORY:  No pertinent past medical history  No significant past surgical history      FAMILY HISTORY:  Family history of diabetes mellitus (Father)  Family history of heart attack (Father)      SOCIAL HISTORY:  Smoking: never  EtOH Use: never    Allergies    No Known Allergies    Intolerances        HOME MEDICATIONS:  · 	Zofran ODT 4 mg oral tablet, disintegratin tab(s) orally every 8 hours, As Needed - for nausea  · 	acetaminophen-oxyCODONE 325 mg-5 mg oral tablet: 1 tab(s) orally every 6 hours, As Needed -for severe pain MDD:4  · 	 mg oral tablet: 1 tab(s) orally every 8 hours, As Needed - for moderate pain  · 	Naprosyn 500 mg oral tablet: 1 tab(s) orally 2 times a day  · 	cyclobenzaprine 10 mg oral tablet: 1 tab(s) orally every 8 hours, As Needed    REVIEW OF SYSTEMS:  Constitutional:   Eyes:  ENT:  CV:  Resp: sob  GI:  :  MSK:  Integumentary:  Neurological:  Psychiatric:  Endocrine:  Hematologic/Lymphatic:  Allergic/Immunologic:  [x ] All other systems negative  [ ] Unable to assess ROS because ________    OBJECTIVE:  ICU Vital Signs Last 24 Hrs  T(C): 37 (2018 06:50), Max: 37 (2018 22:15)  T(F): 98.6 (2018 06:50), Max: 98.6 (2018 22:15)  HR: 105 (2018 06:50) (100 - 118)  BP: 133/90 (2018 06:50) (124/78 - 151/97)  BP(mean): --  ABP: --  ABP(mean): --  RR: 18 (2018 06:50) (16 - 19)  SpO2: 96% (2018 06:50) (95% - 119%)        CAPILLARY BLOOD GLUCOSE          PHYSICAL EXAM:  General:  AAOx3  HEENT: EOMI, PERRL  Lymph Nodes: No LAD  Neck: JVP 4-6cm  Respiratory: decreased bs on left  Cardiovascular: RRR, no m/r/g  Abdomen: soft, NT/ND, no HSM  Extremities: no c/c/e  Skin: nl. skin turgor  Neurological: no deficitis      HOSPITAL MEDICATIONS:  MEDICATIONS  (STANDING):  cholecalciferol 1000 Unit(s) Oral daily  cyanocobalamin 1000 MICROGram(s) Oral daily  pantoprazole    Tablet 40 milliGRAM(s) Oral before breakfast    MEDICATIONS  (PRN):      LABS:                        15.0   13.59 )-----------( 432      ( 2018 20:02 )             45.0     02-16    140  |  102  |  11  ----------------------------<  110<H>  4.0   |  21<L>  |  0.66    Ca    8.9      2018 20:02    TPro  8.6<H>  /  Alb  4.4  /  TBili  0.2  /  DBili  x   /  AST  16  /  ALT  9   /  AlkPhos  70  02-16    PT/INR - ( 2018 20:02 )   PT: 12.3 SEC;   INR: 1.11            RADIOLOGY:  [x] Reviewed and interpreted by me

## 2018-02-17 NOTE — CONSULT NOTE ADULT - ASSESSMENT
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    1. Dyspnea-  -Etiology: Likely 2/2 progression of underlying hemangioendothelioma with metastatic disease to lung  -POCUS: small left pleural effusion, diffuse B-line pattern with irregular appearing pleura  -Clinically stable, on RA  -No role for antibiotics  -Follow up heme/onc at [facility] [place]  -T/c cough suppressant to improve symptoms    Gabe Muhammad DO  Pulmonary and Critical Care Fellow

## 2018-02-17 NOTE — CONSULT NOTE ADULT - SUBJECTIVE AND OBJECTIVE BOX
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 States was sent for second opinion to [facility] [place] in June 2017 and was told that she was "ok".   On this admission the pt c/o nonproductive cough and tiredness x4 days. States that can walk up 1 flight of stairs before becoming tired. Specifically the pt says that does not experience SOB, CP, fever, palpitations sore throat, runny nose.  Was evaluated by a PMD a day ago - CXR was significant for left sided opacity. She was referred to ED to r/o effusion. Pt reports no legs swelling or recent travel. (16 Feb 2018 23:14)      Allergies    No Known Allergies    Intolerances        MEDICATIONS  (STANDING):  cholecalciferol 1000 Unit(s) Oral daily  cyanocobalamin 1000 MICROGram(s) Oral daily  pantoprazole    Tablet 40 milliGRAM(s) Oral before breakfast    MEDICATIONS  (PRN):      PAST MEDICAL & SURGICAL HISTORY:  No pertinent past medical history  No significant past surgical history      FAMILY HISTORY:  Family history of diabetes mellitus (Father)  Family history of heart attack (Father)      SOCIAL HISTORY: No EtOH, no tobacco    REVIEW OF SYSTEMS:    CONSTITUTIONAL: No weakness, fevers or chills  EYES/ENT: No visual changes;  No vertigo or throat pain   NECK: No pain or stiffness  RESPIRATORY: No cough, wheezing, hemoptysis; No shortness of breath  CARDIOVASCULAR: No chest pain or palpitations  GASTROINTESTINAL: No abdominal or epigastric pain. No nausea, vomiting, or hematemesis; No diarrhea or constipation. No melena or hematochezia.  GENITOURINARY: No dysuria, frequency or hematuria  NEUROLOGICAL: No numbness or weakness  SKIN: No itching, burning, rashes, or lesions   All other review of systems is negative unless indicated above.    Height (cm): 165.1 (02-17 @ 02:49)  Weight (kg): 61.2 (02-17 @ 05:00)  BMI (kg/m2): 22.5 (02-17 @ 05:00)  BSA (m2): 1.67 (02-17 @ 05:00)    T(F): 98.2 (02-17-18 @ 15:33), Max: 98.6 (02-16-18 @ 22:15)  HR: 106 (02-17-18 @ 15:33)  BP: 150/94 (02-17-18 @ 15:33)  RR: 20 (02-17-18 @ 15:33)  SpO2: 99% (02-17-18 @ 15:33)  Wt(kg): --    GENERAL: NAD, well-developed  HEAD:  Atraumatic, Normocephalic  EYES: EOMI, PERRLA, conjunctiva and sclera clear  NECK: Supple, No JVD  CHEST/LUNG: Clear to auscultation bilaterally; No wheeze  HEART: Regular rate and rhythm; No murmurs, rubs, or gallops  ABDOMEN: Soft, Nontender, Nondistended; Bowel sounds present  EXTREMITIES:  2+ Peripheral Pulses, No clubbing, cyanosis, or edema  NEUROLOGY: non-focal  SKIN: No rashes or lesions                          15.0   13.59 )-----------( 432      ( 16 Feb 2018 20:02 )             45.0       02-16    140  |  102  |  11  ----------------------------<  110<H>  4.0   |  21<L>  |  0.66    Ca    8.9      16 Feb 2018 20:02    TPro  8.6<H>  /  Alb  4.4  /  TBili  0.2  /  DBili  x   /  AST  16  /  ALT  9   /  AlkPhos  70  02-16    < from: CT Chest w/ IV Cont (02.16.18 @ 20:32) >    IMPRESSION:    Innumerable scattered bilateral pulmonary nodules which have   significantly progressed from 2/10/2015, concerning for worsening   malignancy or metastatic disease.Interval consolidative left upper lobe   opacity measuring 2.5 x 2.1cm.    New partially loculated large left pleural effusion extending to the apex   with adjacent compressive atelectasis.Heart and mediastinum shifted to   the right side secondary to mass effect from large left-sided pleural   effusion, decompression is advised.    Additional findings as above.    < end of copied text >

## 2018-02-17 NOTE — PROGRESS NOTE ADULT - PROBLEM SELECTOR PLAN 3
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

## 2018-02-17 NOTE — DISCHARGE NOTE ADULT - CARE PLAN
Principal Discharge DX:	Pleural effusion on left  Goal:	without difficulty breathing  Assessment and plan of treatment:	Notified your doctor if experiencing shortness of breath or difficulty. Consider calling 911 or going to your nearest emergency room if you are experiencing difficulty with Breathing.  Follow up with your doctor at [facility]  Secondary Diagnosis:	Hemangioendothelioma  Assessment and plan of treatment:	follow up with your Oncologist at [facility]  Secondary Diagnosis:	Cough  Assessment and plan of treatment:	continue Tessalon Perles as needed

## 2018-02-17 NOTE — DISCHARGE NOTE ADULT - MEDICATION SUMMARY - MEDICATIONS TO TAKE
I will START or STAY ON the medications listed below when I get home from the hospital:    benzonatate 100 mg oral capsule  -- 1 cap(s) by mouth every 6 hours, As Needed   -- May cause drowsiness.  Alcohol may intensify this effect.  Use care when operating dangerous machinery.  Swallow whole.  Do not crush.    -- Indication: For cough    omeprazole 40 mg oral delayed release capsule  -- 1 cap(s) by mouth once a day  -- Indication: For GERD    Vitamin B12 1000 mcg oral tablet  -- 1 tab(s) by mouth once a day  -- Indication: For Supplement    Vitamin D3 1000 intl units oral capsule  -- 1 cap(s) by mouth once a day  -- Indication: For Supplement

## 2018-02-17 NOTE — CONSULT NOTE ADULT - ATTENDING COMMENTS
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 Patient has dry cough and mild dyspnea with extreme exertion. O2 sat is 96-98% on RA. I d/w pt and  the risks and benefits of thoracentesis, which would only be needed for therapeutic measure and at this time, she reports minimal respiratory symptoms. Also her extensive tumor burden is more likely contributing to her symptoms than the pleural effusion. No intervention at this time. Pt to f/u with her oncologist at [facility].

## 2018-02-17 NOTE — DISCHARGE NOTE ADULT - PLAN OF CARE
without difficulty breathing Notified your doctor if experiencing shortness of breath or difficulty. Consider calling 911 or going to your nearest emergency room if you are experiencing difficulty with Breathing.  Follow up with your doctor at [facility] follow up with your Oncologist at [facility] continue Tessalon Perles as needed

## 2018-02-17 NOTE — DISCHARGE NOTE ADULT - CARE PROVIDER_API CALL
Joseluis Thompson; MBBS), Internal Medicine  [address] [address] [place] [address]  Phone: [phone]  Fax: [phone]

## 2018-02-17 NOTE — CONSULT NOTE ADULT - PROBLEM SELECTOR RECOMMENDATION 9
patient has not seen Dr. Shaw since 2014, follows at [facility]  last imaging in our system was in 2015, had imaging in June 2017 at [facility]  -pulm consult for thoracentesis with cytology  patient should follow up with outpatient oncologist for next steps

## 2018-02-17 NOTE — DISCHARGE NOTE ADULT - HOSPITAL COURSE
36 y/o female, non smoker, on OCP, ambulatory without assistive devices, the pt has h/o hemangioendothelioma, multiple liver and lung lesion. Pt follows at [facility] was last evaluated in June 2017 and was told that she was "ok". Pt with c/o nonproductive cough and tiredness x4 days. CT chest noted-New partially loculated large left pleural effusion extending to the apex with adjacent compressive atelectasis. Heart and mediastinum shifted to the right side secondary to mass effect from large left-sided pleural effusion. Pulmonary was consulted for possible decompressive thoracentesis. Pt currently with O2 sat is 96-98% on RA. Pulmonary discussed  the risks and benefits of thoracentesis, which would only be needed for therapeutic measure and at this time, she reports minimal respiratory symptoms. Pulmonary impression, Pt extensive tumor burden is more likely contributing to her symptoms than the pleural effusion. No intervention at this time. Pt to f/u with her oncologist at [facility] [place] [facility].  Pt medically stable for discharge.

## 2018-02-17 NOTE — PATIENT PROFILE ADULT. - NS TRANSFER PATIENT BELONGINGS
Clothing earings silver colored/Clothing/Jewelry/Money (specify)/Other belongings/Cell Phone/PDA (specify)

## 2018-02-17 NOTE — PROGRESS NOTE ADULT - PROBLEM SELECTOR PLAN 1
Due to likely metastatic disease c/b leukocytosis and tachycardia in the setting of large Lt pleural effusion; Low suspicion of infectious process;  -Monitor for fever   -No signs of infection at this time  -VS q4h  -Hold Abx   -Malignancy workup (below)  -Decompressive thoracentesis in am as below (holding DVP ppx)

## 2021-10-05 NOTE — PROGRESS NOTE ADULT - ASSESSMENT
36 y/o female, non smoker, on OCP, ambulatory without assistive devices, h/o liver mass - 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 78

## 2022-02-15 NOTE — PATIENT PROFILE ADULT. - HEALTH/HEALTHCARE ANXIETIES, PROFILE
Detail Level: Zone
Continue Regimen: spironolactone 50 mg tablet Qd\\nQuantity: 90.0 Tablet  Days Supply: 90\\nSig: Take one PO qd with food and water. No asa while taking.\\n\\ntretinoin 0.1 % topical cream qhs\\nQuantity: 45.0 g  Days Supply: 60\\nSig: apply thin layer to face qpm  follow with moisturizer
Render In Strict Bullet Format?: No
none

## 2025-02-01 NOTE — H&P ADULT - ENMT
[facility] Consult Note      HISTORY OF PRESENT ILLNESS:    The patient is a pleasant 68-year-old female with significant history of hypertension, diabetes, hypothyroidism, and non-Hodgkin's lymphoma, in remission, who presented to the hospital for management of a MDR Klebsiella UTI.  Patient had developed dysuria, urgency, and frequency starting around January 27.  She went to an urgent care clinic January 29 and had a urine culture sent.  She was given a prescription for 1 dose of fosfomycin which she took that day with some improvement of her symptoms.  Her urine culture went up growing ESBL Klebsiella and she was advised to go to the hospital for IV antibiotics.  She denies any fever, chills, nausea, vomiting, abdominal pain, or diarrhea.  While her urinary symptoms improved after the dose of fosfomycin, the did not fully resolve.  She still has some mild dysuria and frequency.  Infectious disease has been asked to see the patient for advice on therapy.    Patient Past History  Past Medical History:   Diagnosis Date    Abnormal findings on diagnostic imaging of urinary organs 2024    Anemia     Iron deficiency    Atypical squamous cells of undetermined significance on cytologic smear of cervix (ASC-US) 01/31/2013    Bilateral carotid artery stenosis     US Carotid Duplex 5/12/21: moderate stenosis ~50% in the bilateral proximal internal carotid arteries    Essential (primary) hypertension     Fatty liver     Fracture of metacarpal, closed 10/04/2014    History of recurrent UTIs     Hypertriglyceridemia     Hypothyroidism     Multiple thyroid nodules     thyroid ultrasound on 2/28/2018 that revealed a diffusely heterogeneous thyroid gland. Bilateral subcentimeter nodules.    Non-Hodgkin lymphoma (CMD) 1991    s/p chemo and radiation    Nuclear senile cataract of both eyes     Osteopenia 03/11/2007    Overactive bladder     Recurrent UTI     Renal cyst, acquired     CT A/P with contrast  (12/12/19) 2 tiny simple left renal cysts.    Spinal stenosis of lumbar region 12/12/2019    Severe facet arthropathy at the L4-L5 level with minimal spondylolisthesis and moderate central spinal stenosis.    Type 2 diabetes mellitus (CMD)     Ureter, stricture     Vitamin D deficiency        Past Surgical History:   Procedure Laterality Date    Cath place for coronary angiography w md sup - interp graft & rt heart  06/14/2024    She had a coronary angiogram on 6/14/2024 by Dr. Jose Gilliam revealed nonobstructive coronary artery disease and normal cardiac filling pressures.    Colonoscopy  10/28/2016    Dr. Daniel Tarango. Polypectomy x 8. Pathology 5 tubular adenomas and 1 hyperplastic polyp and 2 lymphoid aggregates were removed.    Colonoscopy  11/02/2019    Dr. Daniel Tarango. polypectomy x7. Pathology with 6 tubular adenomas and 1 hyperplastic polyp were removed.    Colonoscopy diagnostic  12/14/2022    Dr. Ian Garrett; tubular adenoma x2 sigmoid colon (removed), tubular adenoma cecum (removed), tubular adenoma appendiceal office (biopsied), tubular adenoma ascending colon (biopsied), hyperplastic polyp descending colon (biopsied), tubular adenoma rectosigmoid (biopsy), hyperplastic polyp rectum (removed)    Colonoscopy diagnostic  05/28/2024    Dr. Ian Garrett; Tubular adenoma x2 cecum (biopsied), tubulovillous adenoma cecum (biopsied), 3 tubular/tubulovillous adenomas ascending colon (biopsied), 3 tubular adenomas transverse colon (biopsied)    Colonoscopy diagnostic  10/16/2024    She had a colonoscopy with numerous polyps removed on 10/16/2024 by Dr. Ian Garrett.  4 tubular adenomas and 1 hyperplastic polyp were removed..  She needs a surveillance colonoscopy in October 2025.    Cystoscopy  11/17/2021    Dr. Irving Florence; normal    Cystoscopy  03/07/2024    Dr. Irving Florence consistent with cystitis.  There were no tumors or mass lesions.    Cystoscopy Bilateral 11/18/2024    She had a cystoscopy with  bilateral ureteral stent insertion on 11/18/2021 by Dr. Irving Florence.    Cystoscopy w/ ureteral stent placement Bilateral 06/10/2024    Dr. Irving Florence; stent removed 6/20/24    Esophagogastroduodenoscopy transoral flex w/bx single or mult  05/28/2024    Dr. Ian Garrett; Gastric antral nodule (benign), gastric antrum and body with nonspecific chronic inflammation. H. Pylori negative. GE junction with marked acute and chronic inflammation and extensive presence of intestinal metaplasia, consistent with Cardenas's esophagus    Esophagogastroduodenoscopy transoral flex w/bx single or mult  10/21/2024    She had an EGD with endoscopic ultrasound on 10/21/2024 by Dr. Ian Garrett. Two gastric nodules were excised. Pathology neuroendocrine tumor.  He recommended a surveillance EGD and colonoscopy in October 2025.    Hysteroscopy w/ polypectomy  07/30/2020    by Dr. Carrera for post-menopausal bleeding       Social History     Socioeconomic History    Marital status: /Civil Union     Spouse name: Not on file    Number of children: Not on file    Years of education: Not on file    Highest education level: Not on file   Occupational History    Occupation: Polish    Tobacco Use    Smoking status: Never     Passive exposure: Never    Smokeless tobacco: Never   Vaping Use    Vaping status: never used   Substance and Sexual Activity    Alcohol use: Never    Drug use: Never    Sexual activity: Not Currently   Other Topics Concern    Not on file   Social History Narrative    Not on file     Social Determinants of Health     Financial Resource Strain: Patient Declined (2/1/2025)    Financial Resource Strain     Unable to Get: Patient declined   Food Insecurity: Patient Declined (2/1/2025)    Food Insecurity     Worried about Food: Patient declined     Food is Gone: Patient declined   Transportation Needs: Not At Risk (2/1/2025)    Transportation Needs     Lack of Reliable Transportation: No   Physical Activity:  High Risk (7/18/2024)    Exercise Vital Sign     Days of Exercise per Week: 5 days     Minutes of Exercise per Session: 0 min   Stress: Low Risk  (6/18/2024)    Stress     How Stressed: Not at all   Social Connections: Low Risk  (2/1/2025)    Social Connections     Social Connectivity: 3 to 5 times a week   Interpersonal Safety: Low Risk  (2/1/2025)    Interpersonal Safety     How often physically hurt: Never     How often insulted or talked down to: Never     How often threatened with harm: Never     How often scream or curse at: Never       Family History   Problem Relation Age of Onset    Hypertension Father     Kidney disease Father     Cancer, Colon Neg Hx     Cancer, Esophageal Neg Hx     Cancer, Rectal Neg Hx     Cancer, Stomach Neg Hx         ALLERGIES:   Allergen Reactions    Atorvastatin Other (See Comments)     Elevated liver function per pt    Farxiga [Dapagliflozin] Other (See Comments)     Recurrent urinary tract infections    Fluzone Other (See Comments)     Pt states she receive influenza vaccine in 2018 with no reaction    Metformin Other (See Comments)     diarrhea        Medications:  Current Facility-Administered Medications   Medication Dose Route Frequency Provider Last Rate Last Admin    ondansetron (ZOFRAN) injection 4 mg  4 mg Intravenous BID PRN Bj Chappell, DO        acetaminophen (TYLENOL) tablet 650 mg  650 mg Oral Q4H PRN Ta, Bj, DO        polyethylene glycol (MIRALAX) packet 17 g  17 g Oral Daily PRN Bj Chappell, DO        aluminum-magnesium hydroxide-simethicone (MAALOX) 200-200-20 MG/5ML suspension 30 mL  30 mL Oral Q4H PRN Bj Chappell, DO        enoxaparin (LOVENOX) injection 40 mg  40 mg Subcutaneous Daily Bj Chappell DO   40 mg at 02/01/25 1514    meropenem (MERREM) 500 mg in sodium chloride 0.9 % 100 mL IVPB  500 mg Intravenous 4 times per day Bj Chappell, DO        furosemide (LASIX) tablet 20 mg  20 mg Oral Daily Bj Chappell DO   20 mg at 02/01/25 1514    insulin glargine (LANTUS) injection 20  Units  20 Units Subcutaneous Nightly Ho, Bj, DO        [START ON 2/2/2025] levothyroxine (SYNTHROID, LEVOTHROID) tablet 75 mcg  75 mcg Oral QAM AC Ho, Bj, DO        losartan (COZAAR) tablet 25 mg  25 mg Oral Daily Ho, Bj, DO        metoPROLOL succinate (TOPROL-XL) ER tablet 50 mg  50 mg Oral BID Ho, Bj, DO        pantoprazole (PROTONIX) EC tablet 40 mg  40 mg Oral Daily PRN Ho, Bj, DO        rosuvastatin (CRESTOR) tablet 20 mg  20 mg Oral Daily Ho, Bj, DO        [Held by provider] spironolactone (ALDACTONE) tablet 25 mg  25 mg Oral Daily Ho, Bj, DO        dextrose 50 % injection 25 g  25 g Intravenous PRN Ho, Bj, DO        dextrose 50 % injection 12.5 g  12.5 g Intravenous PRN Ho, Bj, DO        glucagon (GLUCAGEN) injection 1 mg  1 mg Intramuscular PRN Ho, Bj, DO        dextrose (GLUTOSE) 40 % gel 15 g  15 g Oral PRN Ho, Bj, DO        dextrose (GLUTOSE) 40 % gel 30 g  30 g Oral PRN Ho, Bj, DO        insulin lispro (ADMELOG,HumaLOG) - Correction Dose   Subcutaneous 4x Daily WC Ho, Bj, DO         Prior to Admission medications    Medication Sig Start Date End Date Taking? Authorizing Provider   rosuvastatin (CRESTOR) 20 MG tablet TAKE 1 TABLET DAILY 12/21/24  Yes Bernheim, Bruce S, MD   Vitamin D, Ergocalciferol, 1.25 mg (50,000 units) capsule TAKE 1 CAPSULE TWICE WEEKLY 11/20/24  Yes Bernheim, Bruce S, MD   levothyroxine 75 MCG tablet Take 1 tablet by mouth every morning. 11/20/24  Yes Bernheim, Bruce S, MD   furosemide (LASIX) 40 MG tablet Take 0.5 tablets by mouth daily. May take an additional 20mg as needed for weight gain of 3lbs in a day, 5lbs in a week and or swelling. 11/20/24 11/20/25 Yes Danny Nuñez MD   Ozempic, 2 MG/DOSE, 8 MG/3ML Solution Pen-injector INJECT 2MG SUBCUTANEOUSLY  EVERY 7 DAYS FOR TYPE 2    DIABETES 11/8/24  Yes Bernheim, Bruce S, MD   pantoprazole (PROTONIX) 40 MG tablet Take 1 tablet by mouth daily as needed (acid reflux). 10/28/24  Yes Bernheim, Bruce S, MD   metoPROLOL  succinate (TOPROL-XL) 50 MG 24 hr tablet Take 1 tablet by mouth in the morning and 1 tablet in the evening. 10/24/24  Yes Danny Nuñez MD   butalbital-APAP-caffeine (FIORICET) -40 MG per tablet TAKE 1 TABLET EVERY 4 HOURSAS NEEDED FOR PAIN 10/7/24  Yes Bernheim, Bruce S, MD   Lantus SoloStar 100 UNIT/ML pen-injector Inject 20 Units into the skin nightly. Prime 2 units before each dose. 9/27/24  Yes Bernheim, Bruce S, MD   clobetasol (TEMOVATE) 0.05 % ointment APPLY SMALL AMOUNT TO      AFFECTED AREA 2 TIMES A    WEEK 9/9/24  Yes Mikayla Carrera MD   spironolactone (ALDACTONE) 25 MG tablet Take 1 tablet by mouth daily. 8/26/24  Yes de Los Tasneem Call, NP   losartan (COZAAR) 25 MG tablet Take 1 tablet by mouth daily. 7/18/24 7/13/25 Yes Bernheim, Bruce S, MD   estradiol (ESTRACE) 0.1 MG/GM vaginal cream Place 1 gram vaginally 2-3 times weekly. 6/3/24  Yes Mikayla Carrera MD   aspirin (ECOTRIN) 81 MG EC tablet Take 81 mg by mouth daily.   Yes Ariadne Alfredo,    alendronate (FOSAMAX) 70 MG tablet Take 70 mg by mouth every 7 days. Takes on Sundays   Yes Provider, Outside   fosfomycin (MONUROL) 3 g packet Take 1 packet by mouth 1 time for 1 dose. 1/31/25 1/31/25  Zelda Carlos, CNP   Vibegron (Gemtesa) 75 MG Tab Take 1 tablet by mouth daily.  2/1/25  Provider, Outside   Insulin Pen Needle (Pen Needles) 32G X 4 MM Misc Use to injection insulin 1 times daily.  Remove needle cover(s) to expose needle before injecting. 5/6/24   Solitario Nuñez, DO   Blood Glucose Monitoring Suppl (Accu-Chek Guide) w/Device Kit 1 each as directed. Test blood sugars 2 times daily 4/29/24   Bernheim, Bruce S, MD         Review of Systems  As above      PHYSICAL EXAM :  Vitals:    02/01/25 1458   BP: (!) 151/85   Pulse: 83   Resp:    Temp: 97.5 °F (36.4 °C)       Gen: well appearing, no distress  HEENT: unremarkable  Neck: supple  CV: reg S1, S2  Lungs: clear bilat, no rales or wheezes  Abd: soft, NT, ND  Ext:  well perfused    CENTRAL LINES AND BOLAÑOS IF PRESENT LISTED BELOW       Labs  Recent Labs   Lab 02/01/25  1048   WBC 6.2   RBC 3.50*   HGB 10.4*   HCT 32.1*        Recent Labs   Lab 02/01/25  1048 01/28/25  0722   SODIUM 142 143   POTASSIUM 4.2 4.0   CHLORIDE 109 106   CO2 22 26   BUN 21* 16   CREATININE 0.70 0.60   GLUCOSE 181* 115*   CALCIUM 9.8 9.4     Microbiology Results       None              Imaging  No orders to display           Impression:  -ESBL klebsiella UTI, poa. Status post fosfomycin x 1 dose 1/29    Plan:  -continue meropenem  -follow up cultures here  -asked micro to check fosfo suscep to the klebsiella  -will follow  -d/w Dr. Ta Francisco MD  [facility]  T [phone]  F [phone]  2/1/2025  3:19 PM   details… detailed exam nose/mouth/pharynx